# Patient Record
Sex: MALE | Race: BLACK OR AFRICAN AMERICAN | NOT HISPANIC OR LATINO | Employment: FULL TIME | URBAN - METROPOLITAN AREA
[De-identification: names, ages, dates, MRNs, and addresses within clinical notes are randomized per-mention and may not be internally consistent; named-entity substitution may affect disease eponyms.]

---

## 2023-01-22 ENCOUNTER — HOSPITAL ENCOUNTER (EMERGENCY)
Facility: HOSPITAL | Age: 43
Discharge: HOME | End: 2023-01-22
Attending: EMERGENCY MEDICINE | Admitting: EMERGENCY MEDICINE

## 2023-01-22 VITALS
OXYGEN SATURATION: 94 % | SYSTOLIC BLOOD PRESSURE: 154 MMHG | HEART RATE: 70 BPM | WEIGHT: 200 LBS | DIASTOLIC BLOOD PRESSURE: 99 MMHG | RESPIRATION RATE: 18 BRPM | HEIGHT: 72 IN | BODY MASS INDEX: 27.09 KG/M2 | TEMPERATURE: 98.4 F

## 2023-01-22 DIAGNOSIS — K02.9 DENTAL CARIES: Primary | ICD-10-CM

## 2023-01-22 DIAGNOSIS — K05.10 GINGIVITIS: ICD-10-CM

## 2023-01-22 PROCEDURE — 99282 EMERGENCY DEPT VISIT SF MDM: CPT

## 2023-01-22 RX ORDER — AMOXICILLIN AND CLAVULANATE POTASSIUM 875; 125 MG/1; MG/1
1 TABLET, FILM COATED ORAL 2 TIMES DAILY
Qty: 14 TABLET | Refills: 0 | Status: SHIPPED | OUTPATIENT
Start: 2023-01-22 | End: 2023-01-29

## 2023-01-22 NOTE — ED PROVIDER NOTES
EMERGENCY DEPARTMENT ENCOUNTER    Room Number:  24/24  Date seen:  1/22/2023  PCP: Provider, No Known      HPI:  Chief Complaint: Dental pain  A complete HPI/ROS/PMH/PSH/SH/FH are unobtainable due to: None  Context: Duane Starks is a 42 y.o. male who presents to the ED c/o dental pain.  He states that to his molars of the right mandible broke out 1 month ago.  Over the past 2 days she has developed constant pain.  No fever.  He does not have a dentist.  He has been using Orajel for pain control.          PAST MEDICAL HISTORY  Active Ambulatory Problems     Diagnosis Date Noted   • No Active Ambulatory Problems     Resolved Ambulatory Problems     Diagnosis Date Noted   • No Resolved Ambulatory Problems     No Additional Past Medical History         PAST SURGICAL HISTORY  No past surgical history on file.      FAMILY HISTORY  No family history on file.      SOCIAL HISTORY  Social History     Socioeconomic History   • Marital status: Single         ALLERGIES  Patient has no known allergies.        REVIEW OF SYSTEMS  Review of Systems     All systems reviewed and negative except for those discussed in HPI.       PHYSICAL EXAM  ED Triage Vitals   Temp Heart Rate Resp BP SpO2   01/22/23 1103 01/22/23 1103 01/22/23 1103 01/22/23 1116 01/22/23 1103   98.4 °F (36.9 °C) 68 18 154/99 100 %      Temp src Heart Rate Source Patient Position BP Location FiO2 (%)   01/22/23 1103 01/22/23 1103 -- -- --   Tympanic Monitor          Physical Exam      GENERAL: no acute distress  HENT: Poor dentition, 2 fractured right mandibular molars with surrounding gingival erythema  EYES: no scleral icterus  CV: regular rhythm, normal rate  RESPIRATORY: normal effort  MUSCULOSKELETAL: no deformity  NEURO: alert, moves all extremities, follows commands  PSYCH:  calm, cooperative  SKIN: warm, dry    Vital signs and nursing notes reviewed.          LAB RESULTS  No results found for this or any previous visit (from the past 24 hour(s)).    Ordered  the above labs and reviewed the results.        RADIOLOGY  No Radiology Exams Resulted Within Past 24 Hours    Ordered the above noted radiological studies. Reviewed by me in PACS.          PROCEDURES  Procedures          MEDICATIONS GIVEN IN ER  Medications - No data to display        MEDICAL DECISION MAKING, PROGRESS, and CONSULTS    All labs have been independently reviewed by me.  All radiology studies have been reviewed by me and discussed with radiologist dictating the report.   EKG's independently viewed and interpreted by me.  Discussion below represents my analysis of pertinent findings related to patient's condition, differential diagnosis, treatment plan and final disposition.      Orders placed during this visit:  No orders of the defined types were placed in this encounter.          Differential diagnosis:    Dental caries, abscess, gingivitis, ANUG          Independent interpretation of labs, radiology studies, and discussions with consultants:       Patient is clinically well-appearing.  I am giving him antibiotics to go home with.  He will follow-up with dentistry as he will ultimately need to have these molars removed.           PPE: The patient wore a mask throughout the entire encounter. I wore a well-fitting mask.    DIAGNOSIS  Final diagnoses:   Dental caries   Gingivitis         DISPOSITION  DISCHARGE    FOLLOW-UP  [facility]  [address]  [facility] [address] [phone]  Go to   If symptoms worsen         Medication List      New Prescriptions    amoxicillin-clavulanate 875-125 MG per tablet  Commonly known as: AUGMENTIN  Take 1 tablet by mouth 2 (Two) Times a Day for 7 days.           Where to Get Your Medications      You can get these medications from any pharmacy    Bring a paper prescription for each of these medications  · amoxicillin-clavulanate 875-125 MG per tablet             Latest Documented Vital Signs:  As of 11:24 EST  BP-  154/99 HR- 70 Temp- 98.4 °F (36.9 °C) (Tympanic) O2 sat- 94%      --    Please note that portions of this were completed with a voice recognition program.       Note Disclaimer: At [facility], we believe that sharing information builds trust and better relationships. You are receiving this note because you are receiving care at [facility] or recently visited. It is possible you will see health information before a provider has talked with you about it. This kind of information can be easy to misunderstand. To help you fully understand what it means for your health, we urge you to discuss this note with your provider.       Moses Pedraza II, MD  01/22/23 1129

## 2023-01-22 NOTE — ED NOTES
Pt to ED from home c/o teeth pain x 2 days pt states he broke the 2 teeth while eating a month ago, pain increased today, redness/inflammation to gums noted, pt denies fevers, pt state he has not been to a dentist, pt a/o x 4 at this time     PPE per protocol utilized throughout entire patient encounter.

## 2023-01-22 NOTE — ED NOTES
Pt to ed from home via PV. Pt c/o dental pain x1 month. Pt states the pain has worsened. Pt reports No dental procedures

## 2023-01-22 NOTE — DISCHARGE INSTRUCTIONS
[facility] List  [facility]   [address]   [phone]  Based on Income (Monday-Friday)   Appointment ONLY 8am-1pm   $15 minimum    [facility]   [address]   [phone]  Based on Income (Monday-Friday)   [facility]   $12-24 minimum    [facility]   [address]   [phone]  By appointment ONLY   Must call by 8:30 am to obtain an appointment for the next day   $50 minimum    [facility]   [address]   [phone]  Must be homeless to be seen    [facility]   [address]   [phone]  Walk in and appointments   7 days a week 9am-9pm   $83 minimum    [facility]   [address]   [phone]  Walk in and appointments   9am-4pm   $50 minimum   Passport and other insurances accepted    [facility]   [address]   [phone]  Walk in and appointments   9am-4pm   $50 minimum   Passport and other insurances accepted    [address] [place] [address] [facility]   [address]   [phone]  Walk in and appointments   9am-4pm   $50 minimum   Passport and other insurances accepted    [facility]   [phone]  Walk in and appointments   9am-4pm   $50 minimum   Passport and other insurances accepted    [address]   [phone]  Walk in and appointments   9am-4pm   $50 minimum   Passport and other insurances accepted